# Patient Record
Sex: FEMALE | Race: ASIAN | NOT HISPANIC OR LATINO | URBAN - METROPOLITAN AREA
[De-identification: names, ages, dates, MRNs, and addresses within clinical notes are randomized per-mention and may not be internally consistent; named-entity substitution may affect disease eponyms.]

---

## 2020-01-07 ENCOUNTER — OFFICE VISIT - HEALTHEAST (OUTPATIENT)
Dept: NEUROSURGERY | Facility: CLINIC | Age: 65
End: 2020-01-07

## 2020-01-07 DIAGNOSIS — G93.89 BRAIN MASS: ICD-10-CM

## 2020-01-07 ASSESSMENT — MIFFLIN-ST. JEOR: SCORE: 1088.39

## 2020-01-13 ENCOUNTER — COMMUNICATION - HEALTHEAST (OUTPATIENT)
Dept: NEUROSURGERY | Facility: CLINIC | Age: 65
End: 2020-01-13

## 2020-01-14 ENCOUNTER — COMMUNICATION - HEALTHEAST (OUTPATIENT)
Dept: NEUROSURGERY | Facility: CLINIC | Age: 65
End: 2020-01-14

## 2020-01-14 DIAGNOSIS — D32.9 MENINGIOMA (H): ICD-10-CM

## 2020-03-29 ENCOUNTER — COMMUNICATION - HEALTHEAST (OUTPATIENT)
Dept: NEUROLOGY | Facility: CLINIC | Age: 65
End: 2020-03-29

## 2020-03-31 ENCOUNTER — COMMUNICATION - HEALTHEAST (OUTPATIENT)
Dept: NEUROSURGERY | Facility: CLINIC | Age: 65
End: 2020-03-31

## 2020-05-08 ENCOUNTER — HOSPITAL ENCOUNTER (OUTPATIENT)
Dept: MRI IMAGING | Facility: CLINIC | Age: 65
Discharge: HOME | End: 2020-05-08
Attending: SURGERY

## 2020-05-08 DIAGNOSIS — D32.9 MENINGIOMA (H): ICD-10-CM

## 2020-05-08 LAB
CREAT BLD-MCNC: 0.8 MG/DL (ref 0.6–1.1)
GFR SERPL CREATININE-BSD FRML MDRD: >60 ML/MIN/1.73M2

## 2020-05-18 ENCOUNTER — OFFICE VISIT - HEALTHEAST (OUTPATIENT)
Dept: NEUROSURGERY | Facility: CLINIC | Age: 65
End: 2020-05-18

## 2020-05-18 DIAGNOSIS — D32.9 MENINGIOMA (H): ICD-10-CM

## 2020-05-18 ASSESSMENT — MIFFLIN-ST. JEOR: SCORE: 1088.39

## 2021-02-15 ENCOUNTER — COMMUNICATION - HEALTHEAST (OUTPATIENT)
Dept: NEUROSURGERY | Facility: CLINIC | Age: 66
End: 2021-02-15

## 2021-06-01 ENCOUNTER — RECORDS - HEALTHEAST (OUTPATIENT)
Dept: ADMINISTRATIVE | Facility: CLINIC | Age: 66
End: 2021-06-01

## 2021-06-04 VITALS
DIASTOLIC BLOOD PRESSURE: 83 MMHG | HEART RATE: 69 BPM | WEIGHT: 122 LBS | SYSTOLIC BLOOD PRESSURE: 125 MMHG | OXYGEN SATURATION: 97 % | BODY MASS INDEX: 20.83 KG/M2 | HEIGHT: 64 IN

## 2021-06-04 VITALS
OXYGEN SATURATION: 98 % | DIASTOLIC BLOOD PRESSURE: 66 MMHG | HEIGHT: 64 IN | HEART RATE: 69 BPM | SYSTOLIC BLOOD PRESSURE: 116 MMHG | WEIGHT: 122 LBS | BODY MASS INDEX: 20.83 KG/M2
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
## 2021-06-05 NOTE — TELEPHONE ENCOUNTER
PATIENT NAME:  Adamaris Davis  YOB: 1955  MRN: [id number]  SURGEON: Dr. Greene  DATE of CONSULT: 1/5/2020  DIAGNOSIS: Meningioma (incidental finding)    FOLLOW-UP:    Post Discharge Visit: Dr. Greene would like to see this patient on Tuesday 1/14 in [facility] at 0730 for meningioma.  Per Rishabh's treatment plan note.    Post-op Provider: Dr. Greene  DIAGNOSTICS:  None.     DISPOSITION:  D/C home    ADDITIONAL INSTRUCTIONS FOR MEDICAL STAFF:

## 2021-06-05 NOTE — PATIENT INSTRUCTIONS - HE
Recommend conservatively following the mass for now as you appear asymptomatic. Would perform a short-term follow-up scan to determine stability of the high calcified mass. Will discuss case at brain tumor board next Monday and will follow-up with you regarding the results.
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
## 2021-06-07 NOTE — TELEPHONE ENCOUNTER
During the current COVID19 pandemic efforts are being made to decreased patient exposure by limiting their interactions with healthcare facilities, particularly in our higher risk (age > 60) patients. Efforts are being made to delay imaging where possible, and to engage in virtual/telephone visits whenever possible. Given these current constraints, a chart review has been completed and the follow up recs are as follows:    Given calcified lesion (indicating it is old) ok to push MRI out another 1-2 months. Once MRI done can have telephone visit.    Plans for follow up will be altered accordingly in the case of patient request, change in neurological exam, worsening pain, worsening imaging.     Susi Corrigan AG-ACNP  [facility]  [address], [address]  [place] [address]    O: [phone]  P: [phone]

## 2021-06-07 NOTE — TELEPHONE ENCOUNTER
Called and discussed with Adamaris her treatment plan - she verbalized agreement.  Penny Velasco RN, CNRN
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
